# Patient Record
Sex: FEMALE | Race: WHITE
[De-identification: names, ages, dates, MRNs, and addresses within clinical notes are randomized per-mention and may not be internally consistent; named-entity substitution may affect disease eponyms.]

---

## 2019-03-18 ENCOUNTER — HOSPITAL ENCOUNTER (INPATIENT)
Dept: HOSPITAL 53 - M LDO | Age: 34
LOS: 2 days | Discharge: HOME | End: 2019-03-20
Attending: OBSTETRICS & GYNECOLOGY | Admitting: OBSTETRICS & GYNECOLOGY
Payer: COMMERCIAL

## 2019-03-18 VITALS — SYSTOLIC BLOOD PRESSURE: 105 MMHG | DIASTOLIC BLOOD PRESSURE: 58 MMHG

## 2019-03-18 VITALS — SYSTOLIC BLOOD PRESSURE: 124 MMHG | DIASTOLIC BLOOD PRESSURE: 71 MMHG

## 2019-03-18 VITALS — SYSTOLIC BLOOD PRESSURE: 139 MMHG | DIASTOLIC BLOOD PRESSURE: 73 MMHG

## 2019-03-18 VITALS — DIASTOLIC BLOOD PRESSURE: 62 MMHG | SYSTOLIC BLOOD PRESSURE: 110 MMHG

## 2019-03-18 VITALS — DIASTOLIC BLOOD PRESSURE: 75 MMHG | SYSTOLIC BLOOD PRESSURE: 124 MMHG

## 2019-03-18 VITALS — SYSTOLIC BLOOD PRESSURE: 104 MMHG | DIASTOLIC BLOOD PRESSURE: 62 MMHG

## 2019-03-18 VITALS — SYSTOLIC BLOOD PRESSURE: 111 MMHG | DIASTOLIC BLOOD PRESSURE: 70 MMHG

## 2019-03-18 VITALS — SYSTOLIC BLOOD PRESSURE: 109 MMHG | DIASTOLIC BLOOD PRESSURE: 63 MMHG

## 2019-03-18 VITALS — WEIGHT: 181.88 LBS | HEIGHT: 63 IN | BODY MASS INDEX: 32.23 KG/M2

## 2019-03-18 VITALS — DIASTOLIC BLOOD PRESSURE: 63 MMHG | SYSTOLIC BLOOD PRESSURE: 104 MMHG

## 2019-03-18 VITALS — SYSTOLIC BLOOD PRESSURE: 116 MMHG | DIASTOLIC BLOOD PRESSURE: 57 MMHG

## 2019-03-18 VITALS — SYSTOLIC BLOOD PRESSURE: 108 MMHG | DIASTOLIC BLOOD PRESSURE: 65 MMHG

## 2019-03-18 VITALS — SYSTOLIC BLOOD PRESSURE: 130 MMHG | DIASTOLIC BLOOD PRESSURE: 58 MMHG

## 2019-03-18 VITALS — SYSTOLIC BLOOD PRESSURE: 110 MMHG | DIASTOLIC BLOOD PRESSURE: 64 MMHG

## 2019-03-18 VITALS — SYSTOLIC BLOOD PRESSURE: 124 MMHG | DIASTOLIC BLOOD PRESSURE: 73 MMHG

## 2019-03-18 VITALS — DIASTOLIC BLOOD PRESSURE: 63 MMHG | SYSTOLIC BLOOD PRESSURE: 112 MMHG

## 2019-03-18 VITALS — DIASTOLIC BLOOD PRESSURE: 59 MMHG | SYSTOLIC BLOOD PRESSURE: 106 MMHG

## 2019-03-18 VITALS — DIASTOLIC BLOOD PRESSURE: 60 MMHG | SYSTOLIC BLOOD PRESSURE: 111 MMHG

## 2019-03-18 VITALS — DIASTOLIC BLOOD PRESSURE: 73 MMHG | SYSTOLIC BLOOD PRESSURE: 156 MMHG

## 2019-03-18 VITALS — DIASTOLIC BLOOD PRESSURE: 60 MMHG | SYSTOLIC BLOOD PRESSURE: 126 MMHG

## 2019-03-18 VITALS — SYSTOLIC BLOOD PRESSURE: 112 MMHG | DIASTOLIC BLOOD PRESSURE: 64 MMHG

## 2019-03-18 VITALS — DIASTOLIC BLOOD PRESSURE: 65 MMHG | SYSTOLIC BLOOD PRESSURE: 137 MMHG

## 2019-03-18 VITALS — DIASTOLIC BLOOD PRESSURE: 61 MMHG | SYSTOLIC BLOOD PRESSURE: 114 MMHG

## 2019-03-18 VITALS — DIASTOLIC BLOOD PRESSURE: 66 MMHG | SYSTOLIC BLOOD PRESSURE: 117 MMHG

## 2019-03-18 VITALS — SYSTOLIC BLOOD PRESSURE: 108 MMHG | DIASTOLIC BLOOD PRESSURE: 63 MMHG

## 2019-03-18 VITALS — DIASTOLIC BLOOD PRESSURE: 67 MMHG | SYSTOLIC BLOOD PRESSURE: 113 MMHG

## 2019-03-18 VITALS — SYSTOLIC BLOOD PRESSURE: 113 MMHG | DIASTOLIC BLOOD PRESSURE: 75 MMHG

## 2019-03-18 VITALS — DIASTOLIC BLOOD PRESSURE: 67 MMHG | SYSTOLIC BLOOD PRESSURE: 103 MMHG

## 2019-03-18 VITALS — SYSTOLIC BLOOD PRESSURE: 121 MMHG | DIASTOLIC BLOOD PRESSURE: 65 MMHG

## 2019-03-18 VITALS — DIASTOLIC BLOOD PRESSURE: 67 MMHG | SYSTOLIC BLOOD PRESSURE: 121 MMHG

## 2019-03-18 VITALS — SYSTOLIC BLOOD PRESSURE: 109 MMHG | DIASTOLIC BLOOD PRESSURE: 62 MMHG

## 2019-03-18 VITALS — DIASTOLIC BLOOD PRESSURE: 71 MMHG | SYSTOLIC BLOOD PRESSURE: 126 MMHG

## 2019-03-18 DIAGNOSIS — Z3A.37: ICD-10-CM

## 2019-03-18 LAB
HCT VFR BLD AUTO: 40.8 % (ref 36–47)
HGB BLD-MCNC: 14 G/DL (ref 12–15.5)
MCH RBC QN AUTO: 31.5 PG (ref 27–33)
MCHC RBC AUTO-ENTMCNC: 34.3 G/DL (ref 32–36.5)
MCV RBC AUTO: 91.9 FL (ref 80–96)
PLATELET # BLD AUTO: 153 10^3/UL (ref 150–450)
RBC # BLD AUTO: 4.44 10^6/UL (ref 4–5.4)
WBC # BLD AUTO: 14.3 10^3/UL (ref 4–10)

## 2019-03-18 NOTE — HPEPDOC
Obstetrical History & Physical


General


Date of Admission


Mar 18, 2019 at 16:33





History of Present Illness


32 y/o  at 37+2 with LOF at 1100-noon this AM.  Small amt fluid with mvmt

/standing.  No VB.  Reg ctx's.  Pos FM.  Preg c/b AMA, Rh Neg (rhogam at 32 

wks), h/o macrosomia  9 lb 9 oz.


Chief Complaint:  Contractions, term, LOF, term


Information Provided By:  Patient





Prenatal Care


Prenatal Care:  Good Care





Prenatal Dating


Final EDC by:  LMP, 1st trimester ([place])





Past Medical History


Past Obstetrical History :  


   Past Obstetrical History:  Multigravida ( X3, 7 lb 11 oz , 8 lb 7 oz 

, 9 lb 9 oz )


   Type of Delivery:  Spontaneous Vaginal Del. (x3, no problems with all 3)


GYN History:  No pertinent history


Past Medical History


Surgical History:  [place] teeth





Family History


Significant Family History:  No pertinent family hx





Social History


Marital Status:  


Family situation:  Spouse/partner deployed


Psychosocial History:  No pertinent psych hx


* Smoker:  non-smoker


Alcohol:  Denies


Drugs:  denies





Abuse Violence Screening


Have you been hit/kicked/slapp:  No


Have you been sexually assault:  No





Prenatal Imunizations


Tdap status:  current


Influenza Status:  current





Allergies


Coded Allergies:  


     No Known Allergies (Unverified , 3/18/19)





Medications


Scheduled


Multivitamins/Prenatal (Prenatal 27-0.8 mg) 1 Tab Tab, 1 TAB PO DAILY





Physical Examination


Physical Examination


GENERAL: Alert and oriented times three.


ABDOMEN: Gravid and non-tender to touch. EFW 8.5-9 lbs


FETUS: Is vertex (VTX) by sterile vaginal examination (SVE), 3/75/-2 at 1630, 

large gush with cx check, clear amniotic fluid


EXTREMITIES: No edema.





Vital Signs/I&O





Vital Signs








  Date Time  Temp Pulse Resp B/P (MAP) Pulse Ox O2 Delivery O2 Flow Rate FiO2


 


3/18/19 15:41 98.2 90 18 124/75 (91)    











Laboratory Data


Urine Culture:  No Growth





Pertinent Laboratoy Data


Blood Type:  O-


RBC Antibody Screen:  Negative


HIV:  Negative


Hepatitis B:  Negative


Hepatitis C:  Unknown


Rapid Plasma Reagin:  Nonreactive


Rubella:  Immune


Varicella:  Immune


Chlamydia/Gonorrhea:  Negative


Group B Streptococcus:  Positive


Quad Screen Test:  Declined


Cystic Fibrosis:  Negative


Glucose Tolerance Test:  128





Anatomy Ultrasound


Placenta Location:  Anterior


Normal Anatomy:  Yes


Placenta Previa:  No





Fetal Assessment


Variability:  Moderate


Accelerations:  Positive


Decelerations:  Variable (x1)





Tocometer


Contractions:  Yes


Frequency:  regular


Duration:  greater than 60 seconds


Strength:  palpated as moderate





Assessment/Plan


Assessment


SROM with GBS pos.





Plan


Admit and orient.


 and consent.


Diet: clears


Group B Streptococcus (GBS) pos, PCN 5/2.5


Labs and intravenous (IV) per unit protocol.


Counseled on Pitocin as needed


Lactated Ringers (LR): Bolus 1000 mL, then at 125 mL/hr.


Anticipate normal spontaneous delivery ()


C-S as appropriate.





Sessions MD SANCHEZ,JASON THAKKAR MD          Mar 18, 2019 16:47

## 2019-03-18 NOTE — DNPDOC
[facility] Delivery Note


Delivery Note


DATE OF DELIVERY: 3/18/2019 





PREDELIVERY DIAGNOSIS: 37w5d SROM





POST DELIVERY DIAGNOSIS: Delivered.





PROCEDURE: Spontaneous vaginal delivery





OBSTETRICIAN: Dr. Becca MD





ANESTHESIA: epidural





ESTIMATED BLOOD LOSS: 250 mL.





FINDINGS: 8 pound 0 ounce (3620g) male infant, Apgar Score 9/9





DELIVERY SUMMARY:


Divine is a 34yo [id number] s/p uncomplicated  at 37w5d after presenting with 

SROM earlier in the afternoon, delivering at 22:16 on 3/18/19. She was admitted 

at 3cm, progressed without need for any pitocin, received an epidural, and at 

C/C/0 began pushing. With two sets of pushes, fetal head delivered OA, 

restituted SAYRA. Left anterior shoulder delivered followed by posterior shoulder 

and corpus. Infant immediately had spontaneous cry and vigorous, placed on 

maternal abdomen, nose and mouth suctioned with bulb suction, apgars 9/9. 

Inspection of perineum revealed very small left labial abrasion- one suture 

placed with 3-0 vicryl to reapproximate with hemostasis noted. Umbilical cord 

clamped x2 and cut, cord blood obtained for MBT O negative. With fundal massage 

and traction on the cord, placenta delivered spontaneously and intact with 3 

vessel centrally inserted cord. Bimanual massage performed and IV pitocin given 

per protocol, fundus then firm at u-2cm with hemostasis. 800mcg cytotec placed 

rectally for prophylaxis. All counts correct x2. Mom and infant were doing well 

when I left the room.





MD Becca Steve Katrina D MD           Mar 18, 2019 22:43

## 2019-03-19 VITALS — SYSTOLIC BLOOD PRESSURE: 123 MMHG | DIASTOLIC BLOOD PRESSURE: 75 MMHG

## 2019-03-19 VITALS — SYSTOLIC BLOOD PRESSURE: 110 MMHG | DIASTOLIC BLOOD PRESSURE: 58 MMHG

## 2019-03-19 VITALS — DIASTOLIC BLOOD PRESSURE: 58 MMHG | SYSTOLIC BLOOD PRESSURE: 110 MMHG

## 2019-03-19 RX ADMIN — Medication SCH TAB: at 08:29

## 2019-03-19 RX ADMIN — IBUPROFEN PRN MG: 800 TABLET, FILM COATED ORAL at 18:10

## 2019-03-19 RX ADMIN — IBUPROFEN PRN MG: 800 TABLET, FILM COATED ORAL at 08:29

## 2019-03-19 NOTE — IPNPDOC
Postpartum Progress Note


Date of Service:  Mar 19, 2019


Postpartum Day#:  1


Postpartum Progress Note


PPD 1





SUBJECT: Divine is a 32yo [id number] s/p uncomplicated  at 37w5d after 

presenting with SROM earlier in the afternoon, delivering at 22:16 on 3/18/19 

doing well postpartum day # 1. She has been ambulating, voiding spontaneously 

without issue and tolerating regular diet. Breast feeding without issue. Reports

lochia is like a normal period. Notes cramping- especially with nursing, has not

yet had any motrin. No f/c/n/v/CP/SOB. 





OBJECTIVE: 


VITAL SIGNS: Within normal limits, afebrile.


Alert and oriented times three.


Abdomen: Fundus firm at U-2. Soft, NTTP.


Extremities: no pain with palpation of calves





ASSESSMENT: Divine is a 32yo [id number] s/p uncomplicated  at 37w5d after 

presenting with SROM earlier in the afternoon, delivering at 22:16 on 3/18/19 

doing well postpartum day # 1. Vitals within normal limits, afebrile, 

hemodynamically stable with no evidence of infection.





PLAN:


1. Routine postpartum care


2. Tylenol and Motrin for pain.


3. Encourage breast feeding and ambulation.


4. Regular diet


5. no need for contraception yet with  deployed


6. Likely discharge home tomorrow if meeting all milestones





Dr. Quin Hudson MD





VS, I&O, [facility], [place]


Vital Signs/I&O





Vital Signs








  Date Time  Temp Pulse Resp B/P (MAP) Pulse Ox O2 Delivery O2 Flow Rate FiO2


 


3/19/19 05:50 98.2 89 16 110/58 (75)    














I&O- Last 24 Hours up to 6 AM 


 


 3/19/19





 06:00


 


Intake Total 500 ml


 


Output Total 1050 ml


 


Balance -550 ml











Laboratory Data


24[facility] LABS


Laboratory Tests 2


3/18/19 16:42: 


Nucleated Red Blood Cells % (auto) 0.0, Syphilis Serology NONREACTIVE


3/18/19 17:10: Serology Scanned Report Hepatitis B Testing


CBC/BMP


Laboratory Tests


3/18/19 16:42








Red Blood Count 4.44, Mean Corpuscular Volume 91.9, Mean Corpuscular Hemoglobin 

31.5, Mean Corpuscular Hemoglobin Concent 34.3, Red Cell Distribution Width 14.2











Quin Hudson MD           Mar 19, 2019 07:20

## 2019-03-20 VITALS — DIASTOLIC BLOOD PRESSURE: 62 MMHG | SYSTOLIC BLOOD PRESSURE: 112 MMHG

## 2019-03-20 RX ADMIN — Medication SCH TAB: at 08:33

## 2019-03-20 RX ADMIN — IBUPROFEN PRN MG: 800 TABLET, FILM COATED ORAL at 05:06

## 2019-03-20 NOTE — IPNPDOC
Text Note


Date of Service


The patient was seen on 3/20/19.





NOTE


 PPD2





States feeling well, pain controlled with prescribed meds.  Baby bonding and 

feeding well.  No heavy VB.  Lochia slowing.  Ambulatory.  Tolerating PO without

issues.  Voiding spont.  No CP/LP/SOB.





VSSAF


NAD A&O


LE no C/C/E


Ut at U-2, firm





a/p: Doing well.  Cont routine postpartum care.  D/C today.





Sessions MD





[place], [place]


[place], [facility]





Vital Signs








  Date Time  Temp Pulse Resp B/P (MAP) Pulse Ox O2 Delivery O2 Flow Rate FiO2


 


3/20/19 05:58 97.2 77 18 112/62 (79) 97   

















SESSIONS,JASON THAKKAR MD          Mar 20, 2019 07:34

## 2019-03-20 NOTE — DS.PDOC
Discharge Summary


General


Date of Admission


Mar 18, 2019 at 16:33


Date of Discharge


2019





Discharge Summary


ADMITTING DIAGNOSES: Active labor, SROM





DISCHARGE DIAGNOSES: Same, 





HOSPITAL COURSE: Admitted and delivery uncomplicated, .  Postpartum course 

uncomplicated.  





DISCHARGE MEDICATIONS: Motrin, Lanolin


 


DISCHARGE INSTRUCTIONS: Nothing in the vagina for 6 weeks.  F/U in OBGYN clinic 

in 6-8 weeks.  





Sessions MD





Vital Signs/I&Os





Vital Signs








  Date Time  Temp Pulse Resp B/P (MAP) Pulse Ox O2 Delivery O2 Flow Rate FiO2


 


3/20/19 05:58 97.2 77 18 112/62 (79) 97   











Discharge Medications


Scheduled


Multivitamins/Prenatal (Prenatal 27-0.8 mg) 1 Tab Tab, 1 TAB PO DAILY, 

(Reported)





Allergies


Coded Allergies:  


     No Known Allergies (Unverified , 3/18/19)











SESSIONS,JASON THAKKAR MD          Mar 20, 2019 07:35